# Patient Record
(demographics unavailable — no encounter records)

---

## 2025-03-11 NOTE — HISTORY OF PRESENT ILLNESS
[Sudden] : sudden [10] : 10 [1] : 2 [Dull/Aching] : dull/aching [Sharp] : sharp [Leisure] : leisure [Sleep] : sleep [Rest] : rest [Nothing helps with pain getting better] : Nothing helps with pain getting better [Standing] : standing [Walking] : walking [Stairs] : stairs [de-identified] : no injury, had issue many yrs ago and given a brace, woke up with pain and had issues bearing weight, tried Tylenol and he is Xarelto for DVT and PE, had thrombectomy in early 2000's, history gout. [] : no [FreeTextEntry1] : Right Knee [FreeTextEntry5] : Patient is 56 years old and is here to be evaluated and treated for the RIGHT KNEE. Patient states on 03/07/25 they were unable to bear weight on the right leg. Patient has spoken to a PA and was told it was possibly arthritis. Patient has a history of Blood clots on the right side. [de-identified] : movement

## 2025-03-11 NOTE — PHYSICAL EXAM
[5___] : quadriceps 5[unfilled]/5 [Negative] : negative Samaria's [Right] : right knee [All Views] : anteroposterior, lateral, skyline, and anteroposterior standing [Moderate tricompartmental OA medial narrowing] : Moderate tricompartmental OA medial narrowing [] : negative Lachmann [TWNoteComboBox7] : flexion 110 degrees [de-identified] : extension 3 degrees

## 2025-03-11 NOTE — DISCUSSION/SUMMARY
[de-identified] : Patient allowed to gently start resuming activities. Discussed change to medication prescription and usage. Offered cortisone steroid injection.for pain control Bracing options discussed with patient. Hyaluronic Acid inj pamphlet given to pt. try topical lidocaine for pain reviewed current medications being used by this patient Home exercise for functional improvement

## 2025-03-11 NOTE — PROCEDURE
[Right] : of the right [Alcohol] : alcohol [Betadine] : betadine [Ethyl Chloride sprayed topically] : ethyl chloride sprayed topically [Sterile technique used] : sterile technique used [___ cc    1%] : Lidocaine ~Vcc of 1%  [Effusion] : effusion [de-identified] : 95 cc [de-identified] : cloudy serous fluid [FreeTextEntry3] : Large Joint Injection / Aspiration: Celestone, Lidocaine, Marcaine and Guidance Ultrasound Large Joint Injection was performed because of pain and inflammation. Anesthesia: ethyl chloride sprayed topically..  Celestone: An injection of Celestone 12 mg , 2 cc. Needle size: 22 gauge ,  Lidocaine: 3 cc.  Marcaine: 3 cc.   Medication was injected in the right knee. Patient has tried OTC's including aspirin, Ibuprofen, Aleve etc or prescription NSAIDS, and/or exercises at home and/ or physical therapy without satisfactory response and Patient has decreased mobility in the joint. After verbal consent using sterile preparation and technique. The risks, benefits, and alternatives to cortisone injection were explained in full to the patient. Risks outlined include but are not limited to infection, sepsis, bleeding, scarring, skin discoloration, temporary increase in pain, syncopal episode, failure to resolve symptoms, allergic reaction, symptom recurrence, and elevation of blood sugar in diabetics. Patient understood the risks. All questions were answered. After discussion of options, patient requested an injection. Oral informed consent was obtained and sterile prep was done of the injection site. Sterile technique was utilized for the procedure including the preparation of the solutions used for the injection. Patient tolerated the procedure well. Advised to ice the injection site this evening. Prep with betadine locally to site. Sterile technique used. Patient tolerated procedure well. Post Procedure Instructions: Patient was advised to call if redness, pain, or fever occur and apply ice for 15 min. out of every hour for the next 12-24 hours as tolerated. patient was advised to rest the joint(s) for 2 days.   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   Ultrasound guided injection was performed of the knee, visualization of the needle and placement of injection was performed without complication.